# Patient Record
Sex: MALE | Race: WHITE | HISPANIC OR LATINO | URBAN - METROPOLITAN AREA
[De-identification: names, ages, dates, MRNs, and addresses within clinical notes are randomized per-mention and may not be internally consistent; named-entity substitution may affect disease eponyms.]

---
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
## 2021-05-26 VITALS
SYSTOLIC BLOOD PRESSURE: 110 MMHG | TEMPERATURE: 98.1 F | OXYGEN SATURATION: 99 % | DIASTOLIC BLOOD PRESSURE: 72 MMHG | RESPIRATION RATE: 14 BRPM | HEART RATE: 89 BPM | BODY MASS INDEX: 18.9 KG/M2 | HEIGHT: 71 IN | WEIGHT: 135 LBS

## (undated) NOTE — LETTER
Date & Time: 12/13/2020, 8:44 AM  Patient: Elif Rader Andalcio  Encounter Provider(s):    Saulo Esparza MD       To Whom It May Concern:    Danni Jeronimo was seen and treated in our department on 12/13/2020.  He should not return to work u